# Patient Record
Sex: FEMALE | Race: WHITE | NOT HISPANIC OR LATINO
[De-identification: names, ages, dates, MRNs, and addresses within clinical notes are randomized per-mention and may not be internally consistent; named-entity substitution may affect disease eponyms.]

---

## 2017-02-01 ENCOUNTER — APPOINTMENT (OUTPATIENT)
Dept: MAMMOGRAPHY | Facility: CLINIC | Age: 57
End: 2017-02-01

## 2017-02-01 ENCOUNTER — APPOINTMENT (OUTPATIENT)
Dept: ULTRASOUND IMAGING | Facility: CLINIC | Age: 57
End: 2017-02-01

## 2017-02-08 ENCOUNTER — OUTPATIENT (OUTPATIENT)
Dept: OUTPATIENT SERVICES | Facility: HOSPITAL | Age: 57
LOS: 1 days | End: 2017-02-08
Payer: COMMERCIAL

## 2017-02-08 ENCOUNTER — APPOINTMENT (OUTPATIENT)
Dept: MAMMOGRAPHY | Facility: CLINIC | Age: 57
End: 2017-02-08

## 2017-02-08 ENCOUNTER — APPOINTMENT (OUTPATIENT)
Dept: ULTRASOUND IMAGING | Facility: CLINIC | Age: 57
End: 2017-02-08

## 2017-02-08 DIAGNOSIS — Z00.8 ENCOUNTER FOR OTHER GENERAL EXAMINATION: ICD-10-CM

## 2017-02-08 PROCEDURE — 77067 SCR MAMMO BI INCL CAD: CPT

## 2017-02-08 PROCEDURE — 77063 BREAST TOMOSYNTHESIS BI: CPT

## 2017-02-08 PROCEDURE — 76641 ULTRASOUND BREAST COMPLETE: CPT

## 2017-02-13 DIAGNOSIS — Z12.31 ENCOUNTER FOR SCREENING MAMMOGRAM FOR MALIGNANT NEOPLASM OF BREAST: ICD-10-CM

## 2017-02-13 DIAGNOSIS — R92.2 INCONCLUSIVE MAMMOGRAM: ICD-10-CM

## 2018-01-19 ENCOUNTER — RESULT REVIEW (OUTPATIENT)
Age: 58
End: 2018-01-19

## 2018-01-26 ENCOUNTER — OUTPATIENT (OUTPATIENT)
Dept: OUTPATIENT SERVICES | Facility: HOSPITAL | Age: 58
LOS: 1 days | End: 2018-01-26
Payer: COMMERCIAL

## 2018-01-26 ENCOUNTER — APPOINTMENT (OUTPATIENT)
Dept: MAMMOGRAPHY | Facility: CLINIC | Age: 58
End: 2018-01-26
Payer: COMMERCIAL

## 2018-01-26 ENCOUNTER — APPOINTMENT (OUTPATIENT)
Dept: ULTRASOUND IMAGING | Facility: CLINIC | Age: 58
End: 2018-01-26
Payer: COMMERCIAL

## 2018-01-26 DIAGNOSIS — Z00.8 ENCOUNTER FOR OTHER GENERAL EXAMINATION: ICD-10-CM

## 2018-01-26 PROCEDURE — 76641 ULTRASOUND BREAST COMPLETE: CPT | Mod: 26,50

## 2018-01-26 PROCEDURE — 76641 ULTRASOUND BREAST COMPLETE: CPT

## 2018-01-26 PROCEDURE — 77063 BREAST TOMOSYNTHESIS BI: CPT

## 2018-01-26 PROCEDURE — 77067 SCR MAMMO BI INCL CAD: CPT | Mod: 26

## 2018-01-26 PROCEDURE — 77063 BREAST TOMOSYNTHESIS BI: CPT | Mod: 26

## 2018-01-26 PROCEDURE — 77067 SCR MAMMO BI INCL CAD: CPT

## 2018-05-08 ENCOUNTER — APPOINTMENT (OUTPATIENT)
Dept: DERMATOLOGY | Facility: CLINIC | Age: 58
End: 2018-05-08
Payer: COMMERCIAL

## 2018-05-08 VITALS — WEIGHT: 130 LBS | HEIGHT: 64 IN | BODY MASS INDEX: 22.2 KG/M2

## 2018-05-08 DIAGNOSIS — Z87.39 PERSONAL HISTORY OF OTHER DISEASES OF THE MUSCULOSKELETAL SYSTEM AND CONNECTIVE TISSUE: ICD-10-CM

## 2018-05-08 DIAGNOSIS — Z80.8 FAMILY HISTORY OF MALIGNANT NEOPLASM OF OTHER ORGANS OR SYSTEMS: ICD-10-CM

## 2018-05-08 PROCEDURE — 99203 OFFICE O/P NEW LOW 30 MIN: CPT

## 2018-10-17 ENCOUNTER — APPOINTMENT (OUTPATIENT)
Dept: VASCULAR SURGERY | Facility: CLINIC | Age: 58
End: 2018-10-17
Payer: COMMERCIAL

## 2018-10-17 PROCEDURE — 93926 LOWER EXTREMITY STUDY: CPT

## 2018-10-17 PROCEDURE — 99214 OFFICE O/P EST MOD 30 MIN: CPT

## 2019-02-06 ENCOUNTER — RESULT REVIEW (OUTPATIENT)
Age: 59
End: 2019-02-06

## 2019-02-13 ENCOUNTER — OUTPATIENT (OUTPATIENT)
Dept: OUTPATIENT SERVICES | Facility: HOSPITAL | Age: 59
LOS: 1 days | End: 2019-02-13
Payer: COMMERCIAL

## 2019-02-13 ENCOUNTER — APPOINTMENT (OUTPATIENT)
Dept: ULTRASOUND IMAGING | Facility: CLINIC | Age: 59
End: 2019-02-13
Payer: COMMERCIAL

## 2019-02-13 ENCOUNTER — APPOINTMENT (OUTPATIENT)
Dept: MAMMOGRAPHY | Facility: CLINIC | Age: 59
End: 2019-02-13
Payer: COMMERCIAL

## 2019-02-13 DIAGNOSIS — Z00.8 ENCOUNTER FOR OTHER GENERAL EXAMINATION: ICD-10-CM

## 2019-02-13 PROCEDURE — 77067 SCR MAMMO BI INCL CAD: CPT | Mod: 26

## 2019-02-13 PROCEDURE — 77067 SCR MAMMO BI INCL CAD: CPT

## 2019-02-13 PROCEDURE — 77063 BREAST TOMOSYNTHESIS BI: CPT | Mod: 26

## 2019-02-13 PROCEDURE — 76641 ULTRASOUND BREAST COMPLETE: CPT | Mod: 26,50

## 2019-02-13 PROCEDURE — 76641 ULTRASOUND BREAST COMPLETE: CPT

## 2019-02-13 PROCEDURE — 77063 BREAST TOMOSYNTHESIS BI: CPT

## 2019-04-20 ENCOUNTER — TRANSCRIPTION ENCOUNTER (OUTPATIENT)
Age: 59
End: 2019-04-20

## 2019-05-08 ENCOUNTER — APPOINTMENT (OUTPATIENT)
Dept: DERMATOLOGY | Facility: CLINIC | Age: 59
End: 2019-05-08
Payer: COMMERCIAL

## 2019-05-08 VITALS — BODY MASS INDEX: 22.2 KG/M2 | HEIGHT: 64 IN | WEIGHT: 130 LBS

## 2019-05-08 PROCEDURE — 99213 OFFICE O/P EST LOW 20 MIN: CPT

## 2019-05-08 NOTE — ASSESSMENT
[FreeTextEntry1] : Complete skin examination is negative for malignancy;\par Continue regular exams; FMHx MM\par Follow up for TBSE in 1 year

## 2019-05-08 NOTE — PHYSICAL EXAM
[Full Body Skin Exam Performed] : performed [FreeTextEntry3] : Skin examination performed of the face, neck, trunk, arms, legs; \par The patient is well, alert and oriented, pleasant and cooperative.\par Eyelids, conjunctivae, oral mucosa, digits and nails all normal.  \par No cervical adenopathy.\par \par Normal findings include:\par \par Seborrheic keratoses=- back, shoulders\par Angiomas\par + lentigines and solar damage are present in sun exposed areas; \par \par No lesions were suspicious for malignancy. \par \par

## 2019-05-08 NOTE — HISTORY OF PRESENT ILLNESS
[de-identified] : Pt. presents for skin check;\par No itching, bleeding, growing, changing lesions noted; \par + melanoma in family

## 2019-06-11 ENCOUNTER — EMERGENCY (EMERGENCY)
Facility: HOSPITAL | Age: 59
LOS: 0 days | Discharge: HOME | End: 2019-06-11
Attending: EMERGENCY MEDICINE | Admitting: EMERGENCY MEDICINE
Payer: COMMERCIAL

## 2019-06-11 VITALS
DIASTOLIC BLOOD PRESSURE: 80 MMHG | TEMPERATURE: 98 F | OXYGEN SATURATION: 98 % | HEART RATE: 69 BPM | SYSTOLIC BLOOD PRESSURE: 154 MMHG | RESPIRATION RATE: 18 BRPM

## 2019-06-11 VITALS — WEIGHT: 130.07 LBS | HEIGHT: 65 IN

## 2019-06-11 DIAGNOSIS — Z77.098 CONTACT WITH AND (SUSPECTED) EXPOSURE TO OTHER HAZARDOUS, CHIEFLY NONMEDICINAL, CHEMICALS: ICD-10-CM

## 2019-06-11 DIAGNOSIS — F41.9 ANXIETY DISORDER, UNSPECIFIED: ICD-10-CM

## 2019-06-11 DIAGNOSIS — I10 ESSENTIAL (PRIMARY) HYPERTENSION: ICD-10-CM

## 2019-06-11 PROCEDURE — 99283 EMERGENCY DEPT VISIT LOW MDM: CPT

## 2019-06-11 RX ORDER — FLUORESCEIN SODIUM 9 MG
1 STRIP OPHTHALMIC (EYE) ONCE
Refills: 0 | Status: DISCONTINUED | OUTPATIENT
Start: 2019-06-11 | End: 2019-06-11

## 2019-06-11 RX ORDER — FLUORESCEIN SODIUM 9 MG
1 STRIP OPHTHALMIC (EYE) ONCE
Refills: 0 | Status: COMPLETED | OUTPATIENT
Start: 2019-06-11 | End: 2019-06-11

## 2019-06-11 RX ORDER — ERYTHROMYCIN BASE 5 MG/GRAM
1 OINTMENT (GRAM) OPHTHALMIC (EYE) ONCE
Refills: 0 | Status: COMPLETED | OUTPATIENT
Start: 2019-06-11 | End: 2019-06-11

## 2019-06-11 RX ADMIN — Medication 1 APPLICATION(S): at 22:00

## 2019-06-11 RX ADMIN — Medication 1 DROP(S): at 21:57

## 2019-06-11 RX ADMIN — Medication 1 APPLICATION(S): at 21:56

## 2019-06-11 NOTE — ED STATDOCS - ATTENDING CONTRIBUTION TO CARE
I, Nancy Zamudio MD,  performed the initial face to face bedside interview with this patient regarding history of present illness, review of symptoms and relevant past medical, social and family history.  I completed an independent physical examination.  I was the initial provider who evaluated this patient. I have signed out the follow up of any pending tests (i.e. labs, radiological studies) to the ACP.  I have communicated the patient’s plan of care and disposition with the ACP.  The history, relevant review of systems, past medical and surgical history, medical decision making, and physical examination was documented by the scribe in my presence and I attest to the accuracy of the documentation.

## 2019-06-11 NOTE — ED ADULT NURSE NOTE - OBJECTIVE STATEMENT
Patient reports getting chlorine in eye when filling pool. pt reports flushing eye. redness and irritation noted

## 2019-06-11 NOTE — ED STATDOCS - OBJECTIVE STATEMENT
59 y/o F presents to the ED c/o eye irritation after exposure to super shock. She was opening her pool when the incident occurred. She immediatley flushed her eye but is still having discomfort. 57 y/o F PMH of anxiety and diet controlled HTN, presents to the ED c/o eye irritation after exposure to super shock. She was opening her pool when the incident occurred. She immediatley flushed her eye but is still having discomfort.

## 2019-06-11 NOTE — ED ADULT NURSE NOTE - NSIMPLEMENTINTERV_GEN_ALL_ED
Implemented All Universal Safety Interventions:  [place] to call system. Call bell, personal items and telephone within reach. Instruct patient to call for assistance. Room bathroom lighting operational. Non-slip footwear when patient is off stretcher. Physically safe environment: no spills, clutter or unnecessary equipment. Stretcher in lowest position, wheels locked, appropriate side rails in place.

## 2019-06-11 NOTE — ED STATDOCS - PROGRESS NOTE DETAILS
57 yo female presents with splash of pool shock liquid in her L eye earlier today. Pt washed the eye out at home but feels like there is something there. Fluorescein exam did not show evidence of abrasion but will treat eith erythromycin to prevent infection. Pt able to get in touch with ophthalmology tomorrow to further evaluate the eye. -Alejandro Curiel PA-C

## 2019-06-11 NOTE — ED ADULT TRIAGE NOTE - CHIEF COMPLAINT QUOTE
chlorine in left eye one hour ago while filling pool. flushed eye with water. reports irritation to eye. no vision change.

## 2020-02-10 ENCOUNTER — RESULT REVIEW (OUTPATIENT)
Age: 60
End: 2020-02-10

## 2020-02-22 ENCOUNTER — OUTPATIENT (OUTPATIENT)
Dept: OUTPATIENT SERVICES | Facility: HOSPITAL | Age: 60
LOS: 1 days | End: 2020-02-22
Payer: COMMERCIAL

## 2020-02-22 ENCOUNTER — APPOINTMENT (OUTPATIENT)
Dept: MAMMOGRAPHY | Facility: CLINIC | Age: 60
End: 2020-02-22
Payer: COMMERCIAL

## 2020-02-22 ENCOUNTER — APPOINTMENT (OUTPATIENT)
Dept: ULTRASOUND IMAGING | Facility: CLINIC | Age: 60
End: 2020-02-22
Payer: COMMERCIAL

## 2020-02-22 DIAGNOSIS — Z00.8 ENCOUNTER FOR OTHER GENERAL EXAMINATION: ICD-10-CM

## 2020-02-22 PROCEDURE — 77063 BREAST TOMOSYNTHESIS BI: CPT | Mod: 26

## 2020-02-22 PROCEDURE — 77067 SCR MAMMO BI INCL CAD: CPT

## 2020-02-22 PROCEDURE — 76641 ULTRASOUND BREAST COMPLETE: CPT

## 2020-02-22 PROCEDURE — 77063 BREAST TOMOSYNTHESIS BI: CPT

## 2020-02-22 PROCEDURE — 77067 SCR MAMMO BI INCL CAD: CPT | Mod: 26

## 2020-02-22 PROCEDURE — 76641 ULTRASOUND BREAST COMPLETE: CPT | Mod: 26,50

## 2020-02-26 ENCOUNTER — APPOINTMENT (OUTPATIENT)
Dept: VASCULAR SURGERY | Facility: CLINIC | Age: 60
End: 2020-02-26
Payer: COMMERCIAL

## 2020-02-26 VITALS — HEART RATE: 80 BPM | SYSTOLIC BLOOD PRESSURE: 119 MMHG | DIASTOLIC BLOOD PRESSURE: 69 MMHG

## 2020-02-26 DIAGNOSIS — I73.9 PERIPHERAL VASCULAR DISEASE, UNSPECIFIED: ICD-10-CM

## 2020-02-26 DIAGNOSIS — Z87.891 PERSONAL HISTORY OF NICOTINE DEPENDENCE: ICD-10-CM

## 2020-02-26 DIAGNOSIS — Z82.3 FAMILY HISTORY OF STROKE: ICD-10-CM

## 2020-02-26 DIAGNOSIS — E78.5 HYPERLIPIDEMIA, UNSPECIFIED: ICD-10-CM

## 2020-02-26 PROCEDURE — 99214 OFFICE O/P EST MOD 30 MIN: CPT

## 2020-02-26 PROCEDURE — 93880 EXTRACRANIAL BILAT STUDY: CPT

## 2020-02-26 PROCEDURE — 93926 LOWER EXTREMITY STUDY: CPT

## 2020-03-02 NOTE — END OF VISIT
[FreeTextEntry3] : All medical record entries made by the Scribe were at my, Dr. Hammond's, discretion and personally dictated by me on 02/26/2020 . I have reviewed the chart and agree that the record accurately reflects my personal performance of the history, physical exam, assessment and plan. I have also personally directed, reviewed and agreed to the chart.\par

## 2020-03-02 NOTE — PHYSICAL EXAM
[Respiratory Effort] : normal respiratory effort [No Rash or Lesion] : No rash or lesion [Alert] : alert [Oriented to Person] : oriented to person [Oriented to Place] : oriented to place [Calm] : calm [Oriented to Time] : oriented to time [Normal Rate and Rhythm] : normal rate and rhythm [2+] : right 2+ [JVD] : no jugular venous distention  [Normal Breath Sounds] : Normal breath sounds [Left Carotid Bruit] : no bruit heard over the left carotid [Right Carotid Bruit] : no bruit heard over the right carotid [Ankle Swelling (On Exam)] : not present [] : not present [Varicose Veins Of Lower Extremities] : not present [Abdomen Tenderness] : ~T ~M No abdominal tenderness [de-identified] : Clear  [de-identified] : Well-appearing, NAD [de-identified] : NCAT [de-identified] : FROM

## 2020-03-02 NOTE — ADDENDUM
[FreeTextEntry1] : This note was written by Monet Lau on 02/26/2020 acting as scribe for Dr. Hammond.

## 2020-03-02 NOTE — PROCEDURE
[FreeTextEntry1] : B/L Carotid Duplex demonstrates normal carotids with no plaque.\par \par Arterial Duplex demonstrates RLE patient bypass graft.

## 2020-03-02 NOTE — CONSULT LETTER
[Dear  ___] : Dear  [unfilled], [FreeTextEntry3] : Sincerely, Sincerely, \par \par Claude Hammond M.D. \par , [facility]\par , Department of Surgery [facility]\par Professor of Surgery, [employer] at [facility] \par \par Claude Hammond M.D. \par , [facility]\par , Department of Surgery [facility]\par Professor of Surgery, [employer] at [facility]  [FreeTextEntry1] : I saw Ms Garcia today at your kind referral.  It has been a little over a year since I last saw her. She had a right common iliac to right common femoral artery bypass with reverse saphenous vein in November of 2011. She is doing well and remains very active.  She reports an elevated LDL level on recent blood tests.  She presents for a carotid evaluation given this and the question of whether a statin is needed.  She remains on daily aspirin. \par \par On exam, she has strong pulses in both feet, no edema or skin breakdown. No cervical bruits. Blood pressure is 119/69 and heart rate 80 b/min.\par \par Arterial duplex was showed the bypass on the right is patent without stenosis. The carotid duplex was also negative for any evidence of stenosis or intimal thickening.   \par \par I am pleased that Radha's vein bypass reamins widely patent without any evidence of narrowing.  As sated I think the original arterial obstruction was caused by a fibrotic lesion and not atherosclerotic.  She may be started on a statin to lower LDL levels but I do not see any evidence of atherosclerotic disease.  She should continue her active exercise life style and check with me periodically.   \par \par My complete EMR office note is below for your records.   Hope you are doing well.  \par \par Claude [FreeTextEntry2] : Ronna Aviles MD\par [address], [address], [address]\par [place] [id number]

## 2020-03-02 NOTE — HISTORY OF PRESENT ILLNESS
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 Patient recently saw her PCP where she was worked up with blood work that revealed elevated LDL (141). Patient was sent here to determine her need for statin and carotid duplex. She is currently taking 81 mg ASA. Patient is very active with walking and running 5 times per week on the treadmill. Denies any claudication, rest pain, leg swelling, ulcerations, or TIA/CVA symptoms.\par \par Patient has FHx of stroke (aunt).

## 2020-03-02 NOTE — ASSESSMENT
[Arterial/Venous Disease] : arterial/venous disease [FreeTextEntry1] : 58 y/o F w/ hx of R common iliac to CFA bypass with reversed saphenous vein for external iliac presumed fibrotic lesion (11/2011). Patient is doing well today. Has hyperlipedemia on recent blood tests.  Arterial Duplex today shows patent bypass graft. Also, carotid duplex shows normal carotids with no evidence of plaque. \par \par Discussed the need for statin.  No clear need for it.  I believe the original lesion in the Right external iliac artery was fibrotic from repetitive motion related to bicycling and running.  I also told Radha that there is no need to curtail her active exercise life style.  I am pleased the vein bypass remains widely patent.  Patient will follow up every 1-2 yrs.

## 2020-03-09 ENCOUNTER — APPOINTMENT (OUTPATIENT)
Age: 60
End: 2020-03-09
Payer: COMMERCIAL

## 2020-03-09 VITALS
SYSTOLIC BLOOD PRESSURE: 120 MMHG | BODY MASS INDEX: 21.66 KG/M2 | TEMPERATURE: 98.2 F | HEART RATE: 62 BPM | DIASTOLIC BLOOD PRESSURE: 79 MMHG | WEIGHT: 130 LBS | HEIGHT: 65 IN

## 2020-03-09 DIAGNOSIS — M65.9 SYNOVITIS AND TENOSYNOVITIS, UNSPECIFIED: ICD-10-CM

## 2020-03-09 PROCEDURE — 73560 X-RAY EXAM OF KNEE 1 OR 2: CPT | Mod: LT

## 2020-03-09 PROCEDURE — 99203 OFFICE O/P NEW LOW 30 MIN: CPT

## 2020-03-10 NOTE — DISCUSSION/SUMMARY
[de-identified] : At this time, due to synovitis of the left knee (resolving), I recommend rest, ice and anti-inflammatory.  She will be reassessed in two to three weeks for a possible injection.

## 2020-03-10 NOTE — HISTORY OF PRESENT ILLNESS
[5] : the ailment interference is 5/10 [de-identified] : The patient comes in today with complaints of pain to her left knee.  She has been doing some running and had an onset of pain and swelling.  It is getting a little bit better now.  The patient states the onset/injury occurred in January of 2020.  This injury is not work related or due to an automobile accident. The patient notes the knee is swollen and unsteady.  The patient notes jogging makes her symptoms worse. [] : No

## 2020-03-10 NOTE — PHYSICAL EXAM
[de-identified] : Right Knee: \par Range of Motion in Degrees	\par 	                  Claimant:	Normal:	\par Flexion Active	  135 	                135-degrees	\par Flexion Passive	  135	                135-degrees	\par Extension Active	  0-5	                0-5-degrees	\par Extension Passive	  0-5	                0-5-degrees	\par \par No weakness to flexion/extension.  No evidence of instability in the AP plane or varus or valgus stress.  Negative  Lachman.  Negative pivot shift.  Negative anterior drawer test.  Negative posterior drawer test.  Negative Matthew.  Negative Apley grind.  No medial or lateral joint line tenderness.  No tenderness over the medial and lateral facet of the patella.  No patellofemoral crepitations.  No lateral tilting patella.  No patellar apprehension.  No crepitation in the medial and lateral femoral condyle.  No proximal or distal swelling, edema or tenderness.  No gross motor or sensory deficits.  No intra-articular swelling.  2+ DP and PT pulses. No varus or valgus malalignment.  Skin is intact.  No rashes, scars or lesions.  \par \par Left Knee: \par Range of Motion in Degrees	\par 	                  Claimant:	Normal:	\par Flexion Active	  135 	                135-degrees	\par Flexion Passive	  135	                135-degrees	\par Extension Active	  0-5	                0-5-degrees	\par Extension Passive	  0-5	                0-5-degrees	\par \par No weakness to flexion/extension.  Mild to moderate effusion.  Diffusely tender.  No instability in the AP plane or varus or valgus stress.  Negative Lachman.  Negative pivot shift.  Negative anterior drawer test.  Negative posterior drawer test.  Negative Matthew.  Negative Apley grind.  No medial or lateral joint line tenderness.  No tenderness over the medial or lateral facet of the patella.  No patellofemoral crepitations.  No lateral tilting of the patella.  No patellar apprehension.  No crepitation in the medial and lateral femoral condyle.  No gross motor or sensory deficits.  2+ DP and PT pulses.  No varus or valgus malalignment.  Skin is intact.  No rashes, scars or lesions.\par   [de-identified] : Ambulating with a slightly antalgic to antalgic gait.  Station:  Normal.  [de-identified] : Appearance:  Well-developed, well-nourished female in no acute distress.\par \par   [de-identified] : Radiographs, two views of the left knee, show no obvious osseous abnormalities.

## 2020-03-10 NOTE — ADDENDUM
[FreeTextEntry1] : This note was written by Erika Blanton on 03/10/2020 acting as a scribe for VAL DUMONT III, MD

## 2020-03-10 NOTE — CONSULT LETTER
[Dear  ___] : Dear  [unfilled], [Consult Letter:] : I had the pleasure of evaluating your patient, [unfilled]. [Please see my note below.] : Please see my note below. [Consult Closing:] : Thank you very much for allowing me to participate in the care of this patient.  If you have any questions, please do not hesitate to contact me. [Sincerely,] : Sincerely, [FreeTextEntry3] : Francisco Venegas III, MD\par [facility]/[place]

## 2020-03-26 ENCOUNTER — APPOINTMENT (OUTPATIENT)
Dept: ORTHOPEDIC SURGERY | Facility: CLINIC | Age: 60
End: 2020-03-26

## 2020-04-25 ENCOUNTER — MESSAGE (OUTPATIENT)
Age: 60
End: 2020-04-25

## 2020-05-06 ENCOUNTER — APPOINTMENT (OUTPATIENT)
Dept: DERMATOLOGY | Facility: CLINIC | Age: 60
End: 2020-05-06

## 2020-05-28 ENCOUNTER — APPOINTMENT (OUTPATIENT)
Dept: ORTHOPEDIC SURGERY | Facility: CLINIC | Age: 60
End: 2020-05-28

## 2020-06-26 ENCOUNTER — OUTPATIENT (OUTPATIENT)
Dept: OUTPATIENT SERVICES | Facility: HOSPITAL | Age: 60
LOS: 1 days | End: 2020-06-26
Payer: COMMERCIAL

## 2020-06-26 ENCOUNTER — APPOINTMENT (OUTPATIENT)
Dept: RADIOLOGY | Facility: CLINIC | Age: 60
End: 2020-06-26
Payer: COMMERCIAL

## 2020-06-26 DIAGNOSIS — Z00.8 ENCOUNTER FOR OTHER GENERAL EXAMINATION: ICD-10-CM

## 2020-06-26 PROCEDURE — 77080 DXA BONE DENSITY AXIAL: CPT | Mod: 26

## 2020-06-26 PROCEDURE — 77080 DXA BONE DENSITY AXIAL: CPT

## 2020-10-21 ENCOUNTER — APPOINTMENT (OUTPATIENT)
Dept: DERMATOLOGY | Facility: CLINIC | Age: 60
End: 2020-10-21
Payer: COMMERCIAL

## 2020-10-21 VITALS — BODY MASS INDEX: 21.63 KG/M2 | WEIGHT: 130 LBS

## 2020-10-21 DIAGNOSIS — L72.3 SEBACEOUS CYST: ICD-10-CM

## 2020-10-21 PROCEDURE — 99213 OFFICE O/P EST LOW 20 MIN: CPT | Mod: 25

## 2020-10-21 PROCEDURE — 10060 I&D ABSCESS SIMPLE/SINGLE: CPT

## 2020-10-21 NOTE — HISTORY OF PRESENT ILLNESS
[de-identified] : Pt. presents for skin check;\par No itching, bleeding, growing, changing lesions noted; \par + melanoma in family\par One persistent lesion on lower lip

## 2020-10-21 NOTE — ASSESSMENT
[FreeTextEntry1] : Complete skin examination is negative for malignancy;\par Continue regular exams; FMHx MM\par Follow up for TBSE in 1 year \par \par I&D to milium R lower lip- resolved

## 2020-10-21 NOTE — PHYSICAL EXAM
[Full Body Skin Exam Performed] : performed [FreeTextEntry3] : Skin examination performed of the face, neck, trunk, arms, legs; \par The patient is well, alert and oriented, pleasant and cooperative.\par Eyelids, conjunctivae, oral mucosa, digits and nails all normal.  \par No cervical adenopathy.\par \par Normal findings include:\par \par Seborrheic keratoses=- back, shoulders\par Angiomas\par + lentigines and solar damage are present in sun exposed areas; \par \par No lesions were suspicious for malignancy. \par \par inflamed large milium;  R lower lip border\par \par

## 2021-02-17 ENCOUNTER — RESULT REVIEW (OUTPATIENT)
Age: 61
End: 2021-02-17

## 2021-03-10 ENCOUNTER — APPOINTMENT (OUTPATIENT)
Dept: MAMMOGRAPHY | Facility: CLINIC | Age: 61
End: 2021-03-10
Payer: COMMERCIAL

## 2021-03-10 ENCOUNTER — OUTPATIENT (OUTPATIENT)
Dept: OUTPATIENT SERVICES | Facility: HOSPITAL | Age: 61
LOS: 1 days | End: 2021-03-10
Payer: COMMERCIAL

## 2021-03-10 ENCOUNTER — APPOINTMENT (OUTPATIENT)
Dept: ULTRASOUND IMAGING | Facility: CLINIC | Age: 61
End: 2021-03-10
Payer: COMMERCIAL

## 2021-03-10 DIAGNOSIS — Z12.31 ENCOUNTER FOR SCREENING MAMMOGRAM FOR MALIGNANT NEOPLASM OF BREAST: ICD-10-CM

## 2021-03-10 DIAGNOSIS — R92.2 INCONCLUSIVE MAMMOGRAM: ICD-10-CM

## 2021-03-10 DIAGNOSIS — Z00.8 ENCOUNTER FOR OTHER GENERAL EXAMINATION: ICD-10-CM

## 2021-03-10 PROCEDURE — 77063 BREAST TOMOSYNTHESIS BI: CPT

## 2021-03-10 PROCEDURE — 77067 SCR MAMMO BI INCL CAD: CPT

## 2021-03-10 PROCEDURE — 77067 SCR MAMMO BI INCL CAD: CPT | Mod: 26

## 2021-03-10 PROCEDURE — 77063 BREAST TOMOSYNTHESIS BI: CPT | Mod: 26

## 2021-03-10 PROCEDURE — 76641 ULTRASOUND BREAST COMPLETE: CPT | Mod: 26,50

## 2021-03-10 PROCEDURE — 76641 ULTRASOUND BREAST COMPLETE: CPT

## 2021-03-18 ENCOUNTER — APPOINTMENT (OUTPATIENT)
Dept: MAMMOGRAPHY | Facility: CLINIC | Age: 61
End: 2021-03-18
Payer: COMMERCIAL

## 2021-03-18 ENCOUNTER — OUTPATIENT (OUTPATIENT)
Dept: OUTPATIENT SERVICES | Facility: HOSPITAL | Age: 61
LOS: 1 days | End: 2021-03-18
Payer: COMMERCIAL

## 2021-03-18 ENCOUNTER — APPOINTMENT (OUTPATIENT)
Dept: ULTRASOUND IMAGING | Facility: CLINIC | Age: 61
End: 2021-03-18
Payer: COMMERCIAL

## 2021-03-18 DIAGNOSIS — Z00.8 ENCOUNTER FOR OTHER GENERAL EXAMINATION: ICD-10-CM

## 2021-03-18 PROCEDURE — G0279: CPT

## 2021-03-18 PROCEDURE — 77065 DX MAMMO INCL CAD UNI: CPT | Mod: 26,RT

## 2021-03-18 PROCEDURE — 76642 ULTRASOUND BREAST LIMITED: CPT | Mod: 26,RT

## 2021-03-18 PROCEDURE — G0279: CPT | Mod: 26

## 2021-03-18 PROCEDURE — 76642 ULTRASOUND BREAST LIMITED: CPT

## 2021-03-18 PROCEDURE — 77065 DX MAMMO INCL CAD UNI: CPT

## 2021-09-13 ENCOUNTER — OUTPATIENT (OUTPATIENT)
Dept: OUTPATIENT SERVICES | Facility: HOSPITAL | Age: 61
LOS: 1 days | End: 2021-09-13
Payer: COMMERCIAL

## 2021-09-13 ENCOUNTER — APPOINTMENT (OUTPATIENT)
Dept: MAMMOGRAPHY | Facility: CLINIC | Age: 61
End: 2021-09-13
Payer: COMMERCIAL

## 2021-09-13 ENCOUNTER — APPOINTMENT (OUTPATIENT)
Dept: ULTRASOUND IMAGING | Facility: CLINIC | Age: 61
End: 2021-09-13
Payer: COMMERCIAL

## 2021-09-13 DIAGNOSIS — Z00.8 ENCOUNTER FOR OTHER GENERAL EXAMINATION: ICD-10-CM

## 2021-09-13 PROCEDURE — 76642 ULTRASOUND BREAST LIMITED: CPT | Mod: 26,RT

## 2021-09-13 PROCEDURE — 77065 DX MAMMO INCL CAD UNI: CPT | Mod: 26,RT

## 2021-09-13 PROCEDURE — G0279: CPT | Mod: 26

## 2021-09-13 PROCEDURE — G0279: CPT

## 2021-09-13 PROCEDURE — 77065 DX MAMMO INCL CAD UNI: CPT

## 2021-09-13 PROCEDURE — 76642 ULTRASOUND BREAST LIMITED: CPT

## 2021-10-27 ENCOUNTER — APPOINTMENT (OUTPATIENT)
Dept: DERMATOLOGY | Facility: CLINIC | Age: 61
End: 2021-10-27
Payer: COMMERCIAL

## 2021-10-27 PROCEDURE — 99213 OFFICE O/P EST LOW 20 MIN: CPT

## 2021-10-27 NOTE — HISTORY OF PRESENT ILLNESS
[de-identified] : Pt. presents for skin check;\par c/o few spots of concern;  \par Severity:  mild  \par Modifying factors:  none\par Associated symptoms:  none\par Context:  no association with activity\par + melanoma in family\par

## 2021-10-27 NOTE — ASSESSMENT
[FreeTextEntry1] : Complete skin examination is negative for malignancy; Multiple new concerns were addressed and discussed.\par Therapeutic options and their risks and benefits; along with multiple diagnostic possibilities were discussed at length;\par risks and benefits of skin biopsy and/or other further study were discussed;\par \par \par Continue regular exams; FMHx MM\par Follow up for TBSE in 1 year \par

## 2021-10-27 NOTE — PHYSICAL EXAM
[Full Body Skin Exam Performed] : performed [FreeTextEntry3] : Skin examination performed of the face, neck, trunk, arms, legs; \par The patient is well, alert and oriented, pleasant and cooperative.\par Eyelids, conjunctivae, oral mucosa, digits and nails all normal.  \par No cervical adenopathy.\par \par Normal findings include:\par \par Seborrheic keratoses=- back, shoulders\par Angiomas\par + lentigines and solar damage are present in sun exposed areas; \par \par No lesions were suspicious for malignancy. \par \par \par \par

## 2021-11-03 ENCOUNTER — APPOINTMENT (OUTPATIENT)
Dept: ORTHOPEDIC SURGERY | Facility: CLINIC | Age: 61
End: 2021-11-03

## 2022-01-14 ENCOUNTER — TRANSCRIPTION ENCOUNTER (OUTPATIENT)
Age: 62
End: 2022-01-14

## 2022-01-24 ENCOUNTER — TRANSCRIPTION ENCOUNTER (OUTPATIENT)
Age: 62
End: 2022-01-24

## 2022-01-24 ENCOUNTER — APPOINTMENT (OUTPATIENT)
Dept: CT IMAGING | Facility: CLINIC | Age: 62
End: 2022-01-24
Payer: COMMERCIAL

## 2022-01-24 ENCOUNTER — OUTPATIENT (OUTPATIENT)
Dept: OUTPATIENT SERVICES | Facility: HOSPITAL | Age: 62
LOS: 1 days | End: 2022-01-24
Payer: COMMERCIAL

## 2022-01-24 DIAGNOSIS — Z00.8 ENCOUNTER FOR OTHER GENERAL EXAMINATION: ICD-10-CM

## 2022-01-24 PROCEDURE — 74178 CT ABD&PLV WO CNTR FLWD CNTR: CPT

## 2022-01-24 PROCEDURE — 74178 CT ABD&PLV WO CNTR FLWD CNTR: CPT | Mod: 26

## 2022-01-24 PROCEDURE — 82565 ASSAY OF CREATININE: CPT

## 2022-02-09 ENCOUNTER — RESULT REVIEW (OUTPATIENT)
Age: 62
End: 2022-02-09

## 2022-03-21 ENCOUNTER — OUTPATIENT (OUTPATIENT)
Dept: OUTPATIENT SERVICES | Facility: HOSPITAL | Age: 62
LOS: 1 days | End: 2022-03-21
Payer: COMMERCIAL

## 2022-03-21 ENCOUNTER — APPOINTMENT (OUTPATIENT)
Dept: ULTRASOUND IMAGING | Facility: CLINIC | Age: 62
End: 2022-03-21
Payer: COMMERCIAL

## 2022-03-21 ENCOUNTER — APPOINTMENT (OUTPATIENT)
Dept: MAMMOGRAPHY | Facility: CLINIC | Age: 62
End: 2022-03-21
Payer: COMMERCIAL

## 2022-03-21 DIAGNOSIS — Z00.8 ENCOUNTER FOR OTHER GENERAL EXAMINATION: ICD-10-CM

## 2022-03-21 PROCEDURE — G0279: CPT | Mod: 26

## 2022-03-21 PROCEDURE — 76641 ULTRASOUND BREAST COMPLETE: CPT | Mod: 26,50

## 2022-03-21 PROCEDURE — 77066 DX MAMMO INCL CAD BI: CPT

## 2022-03-21 PROCEDURE — 77066 DX MAMMO INCL CAD BI: CPT | Mod: 26

## 2022-03-21 PROCEDURE — G0279: CPT

## 2022-03-21 PROCEDURE — 76641 ULTRASOUND BREAST COMPLETE: CPT

## 2022-11-02 ENCOUNTER — APPOINTMENT (OUTPATIENT)
Dept: DERMATOLOGY | Facility: CLINIC | Age: 62
End: 2022-11-02

## 2022-11-02 PROCEDURE — 99213 OFFICE O/P EST LOW 20 MIN: CPT

## 2022-11-02 NOTE — HISTORY OF PRESENT ILLNESS
[de-identified] : Pt. presents for skin check;\par c/o few spots of concern;  \par Severity:  mild  \par Modifying factors:  none\par Associated symptoms:  none\par Context:  no association with activity\par + melanoma in family\par

## 2023-01-12 ENCOUNTER — OFFICE (OUTPATIENT)
Dept: URBAN - METROPOLITAN AREA CLINIC 109 | Facility: CLINIC | Age: 63
Setting detail: OPHTHALMOLOGY
End: 2023-01-12
Payer: COMMERCIAL

## 2023-01-12 DIAGNOSIS — H01.004: ICD-10-CM

## 2023-01-12 DIAGNOSIS — H01.002: ICD-10-CM

## 2023-01-12 DIAGNOSIS — H16.223: ICD-10-CM

## 2023-01-12 DIAGNOSIS — H01.005: ICD-10-CM

## 2023-01-12 DIAGNOSIS — H01.001: ICD-10-CM

## 2023-01-12 DIAGNOSIS — H43.393: ICD-10-CM

## 2023-01-12 DIAGNOSIS — H25.13: ICD-10-CM

## 2023-01-12 PROCEDURE — 92014 COMPRE OPH EXAM EST PT 1/>: CPT | Performed by: OPHTHALMOLOGY

## 2023-01-12 ASSESSMENT — AXIALLENGTH_DERIVED
OD_AL: 22.6926
OS_AL: 22.7299

## 2023-01-12 ASSESSMENT — LID EXAM ASSESSMENTS
OS_BLEPHARITIS: LLL LUL 1+
OD_BLEPHARITIS: RLL RUL 1+

## 2023-01-12 ASSESSMENT — REFRACTION_AUTOREFRACTION
OS_CYLINDER: -1.00
OD_SPHERE: +1.25
OD_CYLINDER: -1.00
OD_AXIS: 101
OS_AXIS: 90
OS_SPHERE: +1.50

## 2023-01-12 ASSESSMENT — SUPERFICIAL PUNCTATE KERATITIS (SPK)
OS_SPK: 1+
OD_SPK: T

## 2023-01-12 ASSESSMENT — DECREASING TEAR LAKE - SEVERITY SCORE
OS_DEC_TEARLAKE: 1+
OD_DEC_TEARLAKE: 1+

## 2023-01-12 ASSESSMENT — KERATOMETRY
OS_AXISANGLE_DEGREES: 163
OS_K1POWER_DIOPTERS: 44.75
OD_K2POWER_DIOPTERS: 45.50
OD_AXISANGLE_DEGREES: 53
OD_K1POWER_DIOPTERS: 45.00
OS_K2POWER_DIOPTERS: 45.00

## 2023-01-12 ASSESSMENT — VISUAL ACUITY
OD_BCVA: 20/30-2
OS_BCVA: 20/20-2

## 2023-01-12 ASSESSMENT — SPHEQUIV_DERIVED
OS_SPHEQUIV: 1
OD_SPHEQUIV: 0.75

## 2023-01-12 ASSESSMENT — CONFRONTATIONAL VISUAL FIELD TEST (CVF)
OD_FINDINGS: FULL
OS_FINDINGS: FULL

## 2023-01-12 ASSESSMENT — TONOMETRY
OD_IOP_MMHG: 15
OS_IOP_MMHG: 16

## 2023-01-16 ENCOUNTER — APPOINTMENT (OUTPATIENT)
Dept: ULTRASOUND IMAGING | Facility: CLINIC | Age: 63
End: 2023-01-16
Payer: COMMERCIAL

## 2023-01-16 ENCOUNTER — OUTPATIENT (OUTPATIENT)
Dept: OUTPATIENT SERVICES | Facility: HOSPITAL | Age: 63
LOS: 1 days | End: 2023-01-16
Payer: COMMERCIAL

## 2023-01-16 DIAGNOSIS — R31.0 GROSS HEMATURIA: ICD-10-CM

## 2023-01-16 PROCEDURE — 76770 US EXAM ABDO BACK WALL COMP: CPT

## 2023-01-16 PROCEDURE — 76770 US EXAM ABDO BACK WALL COMP: CPT | Mod: 26

## 2023-03-22 ENCOUNTER — APPOINTMENT (OUTPATIENT)
Dept: RADIOLOGY | Facility: CLINIC | Age: 63
End: 2023-03-22
Payer: COMMERCIAL

## 2023-03-22 ENCOUNTER — APPOINTMENT (OUTPATIENT)
Dept: ULTRASOUND IMAGING | Facility: CLINIC | Age: 63
End: 2023-03-22
Payer: COMMERCIAL

## 2023-03-22 ENCOUNTER — OUTPATIENT (OUTPATIENT)
Dept: OUTPATIENT SERVICES | Facility: HOSPITAL | Age: 63
LOS: 1 days | End: 2023-03-22
Payer: COMMERCIAL

## 2023-03-22 ENCOUNTER — APPOINTMENT (OUTPATIENT)
Dept: MAMMOGRAPHY | Facility: CLINIC | Age: 63
End: 2023-03-22
Payer: COMMERCIAL

## 2023-03-22 DIAGNOSIS — N95.9 UNSPECIFIED MENOPAUSAL AND PERIMENOPAUSAL DISORDER: ICD-10-CM

## 2023-03-22 DIAGNOSIS — Z12.31 ENCOUNTER FOR SCREENING MAMMOGRAM FOR MALIGNANT NEOPLASM OF BREAST: ICD-10-CM

## 2023-03-22 DIAGNOSIS — R92.2 INCONCLUSIVE MAMMOGRAM: ICD-10-CM

## 2023-03-22 PROCEDURE — 76641 ULTRASOUND BREAST COMPLETE: CPT | Mod: 26,50

## 2023-03-22 PROCEDURE — 77080 DXA BONE DENSITY AXIAL: CPT

## 2023-03-22 PROCEDURE — 77080 DXA BONE DENSITY AXIAL: CPT | Mod: 26

## 2023-03-22 PROCEDURE — 76641 ULTRASOUND BREAST COMPLETE: CPT

## 2023-03-22 PROCEDURE — 77063 BREAST TOMOSYNTHESIS BI: CPT

## 2023-03-22 PROCEDURE — 77063 BREAST TOMOSYNTHESIS BI: CPT | Mod: 26

## 2023-03-22 PROCEDURE — 77067 SCR MAMMO BI INCL CAD: CPT | Mod: 26

## 2023-03-22 PROCEDURE — 77067 SCR MAMMO BI INCL CAD: CPT

## 2023-06-12 ENCOUNTER — NON-APPOINTMENT (OUTPATIENT)
Age: 63
End: 2023-06-12

## 2023-06-13 ENCOUNTER — APPOINTMENT (OUTPATIENT)
Dept: DERMATOLOGY | Facility: CLINIC | Age: 63
End: 2023-06-13
Payer: COMMERCIAL

## 2023-06-13 VITALS — WEIGHT: 125 LBS | HEIGHT: 65 IN | BODY MASS INDEX: 20.83 KG/M2

## 2023-06-13 PROCEDURE — 99214 OFFICE O/P EST MOD 30 MIN: CPT

## 2023-06-13 PROCEDURE — 0028D: CPT

## 2023-06-13 RX ORDER — MINOCYCLINE HYDROCHLORIDE 75 MG/1
TABLET ORAL
Refills: 0 | Status: DISCONTINUED | COMMUNITY
End: 2023-06-13

## 2023-06-13 RX ORDER — HYDROQUINONE 40 MG/G
4 CREAM TOPICAL
Qty: 1 | Refills: 0 | Status: ACTIVE | COMMUNITY
Start: 2023-06-13 | End: 1900-01-01

## 2023-06-13 NOTE — HISTORY OF PRESENT ILLNESS
[FreeTextEntry1] : brown patch [de-identified] : EBER BORJA is a 62 year old F who present for f/u as below. \par \par #Brown patch "mustache" on upper lip, worse in summertime, xmonths. \par Of note pt has been taking minocycline 100mg daily per PCP for MANY YEARS. \par \par Personal hx of skin cancer: no\par FHx of skin cancer: +melanoma in mom and maternal uncle\par Social hx: engaged; cybersecurity IT at [employer]

## 2023-06-13 NOTE — ASSESSMENT
[FreeTextEntry1] : #Hyperpigmentation of upper lip and palate - new dx of uncertain prognosis\par Favor minocycline-induced pigmentation\par DDx melasma, though less likely\par - Diagnosis and treatment options discussed\par We have discussed the nature and course of this condition.\par I have discussed the goals of therapy with the patient.\par We have discussed treatment options and expectations from treatment.\par - STOP minocycline\par - Start hydroquinone 4% BID to AA for 2 months. SED\par - Tinted SPF EltaMD recommended\par \par Photo in chart\par RTC 2 months

## 2023-06-13 NOTE — PHYSICAL EXAM
[Alert] : alert [Oriented x 3] : ~L oriented x 3 [Well Nourished] : well nourished [Conjunctiva Non-injected] : conjunctiva non-injected [No Visual Lymphadenopathy] : no visual  lymphadenopathy [No Clubbing] : no clubbing [No Edema] : no edema [No Bromhidrosis] : no bromhidrosis [No Chromhidrosis] : no chromhidrosis [Declined] : declined [FreeTextEntry3] : Focused exam only (see below) per patient request:\par \par greyish patches on upper lip, as well as palate

## 2023-08-31 ENCOUNTER — APPOINTMENT (OUTPATIENT)
Dept: DERMATOLOGY | Facility: CLINIC | Age: 63
End: 2023-08-31
Payer: COMMERCIAL

## 2023-08-31 PROCEDURE — 99214 OFFICE O/P EST MOD 30 MIN: CPT

## 2023-08-31 NOTE — PHYSICAL EXAM
[Alert] : alert [Oriented x 3] : ~L oriented x 3 [Well Nourished] : well nourished [Conjunctiva Non-injected] : conjunctiva non-injected [No Visual Lymphadenopathy] : no visual  lymphadenopathy [No Clubbing] : no clubbing [No Edema] : no edema [No Bromhidrosis] : no bromhidrosis [No Chromhidrosis] : no chromhidrosis [Declined] : declined [FreeTextEntry3] : Focused exam only (see below) per patient request:  greyish patches on upper lip, as well as >> palate

## 2023-08-31 NOTE — HISTORY OF PRESENT ILLNESS
[FreeTextEntry1] : brown patch [de-identified] : EBER BORJA is a 62 year old F who present for f/u as below.   #Brown patch "mustache" on upper lip, worse in summertime, xmonths.  Of note pt has been taking minocycline 100mg daily per PCP for MANY YEARS.  - 6/13/23: Rx'ed hydroquinone 4% cream BID  - 8/31/23: pt notes no improvement with HQ 4%; notes occasional irritation.   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

## 2023-08-31 NOTE — ASSESSMENT
[FreeTextEntry1] : #Hyperpigmentation of upper lip and palate - chronic; exacerbation Favor minocycline-induced pigmentation DDx melasma, though less likely.  Failed hydroquinone 4%  - Diagnosis and treatment options discussed We have discussed the nature and course of this condition. I have discussed the goals of therapy with the patient. We have discussed treatment options and expectations from treatment. - Tinted SPF EltaMD  - Start [employer] hydroquinone cream nightly for 3 months; email [email or web address] Hydroquinone: 8% Tretinoin: 0.025% Kojic Acid: 1% Niacinamide: 4% Fluocinolone: 0.025%  Photo in chart RTC 3 months

## 2023-11-08 ENCOUNTER — APPOINTMENT (OUTPATIENT)
Dept: DERMATOLOGY | Facility: CLINIC | Age: 63
End: 2023-11-08
Payer: COMMERCIAL

## 2023-11-08 DIAGNOSIS — L82.1 OTHER SEBORRHEIC KERATOSIS: ICD-10-CM

## 2023-11-08 DIAGNOSIS — D22.5 MELANOCYTIC NEVI OF TRUNK: ICD-10-CM

## 2023-11-08 DIAGNOSIS — L81.4 OTHER MELANIN HYPERPIGMENTATION: ICD-10-CM

## 2023-11-08 PROCEDURE — 99214 OFFICE O/P EST MOD 30 MIN: CPT

## 2023-11-16 ENCOUNTER — APPOINTMENT (OUTPATIENT)
Dept: DERMATOLOGY | Facility: CLINIC | Age: 63
End: 2023-11-16
Payer: COMMERCIAL

## 2023-11-16 DIAGNOSIS — L24.9 IRRITANT CONTACT DERMATITIS, UNSPECIFIED CAUSE: ICD-10-CM

## 2023-11-16 PROCEDURE — 99214 OFFICE O/P EST MOD 30 MIN: CPT

## 2024-02-03 ENCOUNTER — OUTPATIENT (OUTPATIENT)
Dept: OUTPATIENT SERVICES | Facility: HOSPITAL | Age: 64
LOS: 1 days | End: 2024-02-03
Payer: COMMERCIAL

## 2024-02-03 ENCOUNTER — APPOINTMENT (OUTPATIENT)
Dept: ULTRASOUND IMAGING | Facility: CLINIC | Age: 64
End: 2024-02-03
Payer: COMMERCIAL

## 2024-02-03 DIAGNOSIS — Z00.8 ENCOUNTER FOR OTHER GENERAL EXAMINATION: ICD-10-CM

## 2024-02-03 DIAGNOSIS — N20.0 CALCULUS OF KIDNEY: ICD-10-CM

## 2024-02-03 PROCEDURE — 76770 US EXAM ABDO BACK WALL COMP: CPT | Mod: 26

## 2024-02-03 PROCEDURE — 76770 US EXAM ABDO BACK WALL COMP: CPT

## 2024-02-14 ENCOUNTER — APPOINTMENT (OUTPATIENT)
Dept: MAMMOGRAPHY | Facility: CLINIC | Age: 64
End: 2024-02-14
Payer: COMMERCIAL

## 2024-02-14 ENCOUNTER — OUTPATIENT (OUTPATIENT)
Dept: OUTPATIENT SERVICES | Facility: HOSPITAL | Age: 64
LOS: 1 days | End: 2024-02-14
Payer: COMMERCIAL

## 2024-02-14 ENCOUNTER — APPOINTMENT (OUTPATIENT)
Dept: ULTRASOUND IMAGING | Facility: CLINIC | Age: 64
End: 2024-02-14
Payer: COMMERCIAL

## 2024-02-14 DIAGNOSIS — Z00.8 ENCOUNTER FOR OTHER GENERAL EXAMINATION: ICD-10-CM

## 2024-02-14 PROCEDURE — 77066 DX MAMMO INCL CAD BI: CPT | Mod: 26

## 2024-02-14 PROCEDURE — G0279: CPT

## 2024-02-14 PROCEDURE — G0279: CPT | Mod: 26

## 2024-02-14 PROCEDURE — 77066 DX MAMMO INCL CAD BI: CPT

## 2024-02-14 PROCEDURE — 76641 ULTRASOUND BREAST COMPLETE: CPT | Mod: 26,50

## 2024-02-14 PROCEDURE — 76641 ULTRASOUND BREAST COMPLETE: CPT

## 2024-02-15 ENCOUNTER — APPOINTMENT (OUTPATIENT)
Dept: DERMATOLOGY | Facility: CLINIC | Age: 64
End: 2024-02-15
Payer: COMMERCIAL

## 2024-02-15 DIAGNOSIS — L81.9 DISORDER OF PIGMENTATION, UNSPECIFIED: ICD-10-CM

## 2024-02-15 PROCEDURE — 99213 OFFICE O/P EST LOW 20 MIN: CPT

## 2024-02-15 NOTE — ASSESSMENT
[FreeTextEntry1] : #Hyperpigmentation of upper lip and palate - chronic; persistent.  No improvement with combination hydroquinone cream (also irritating with use), as well as hydroquinone 4%.   Favor minocycline-induced pigmentation. - Diagnosis, chronic nature, disease course, treatment options and goals of therapy discussed - Tinted SPF EltaMD - INCREASE to BID use of TXA 8%/azelaic compounded cream - STOP below [facility] hydroquinone cream: Hydroquinone: 8%, Tretinoin: 0.025%, Kojic Acid: 1%, Niacinamide: 4%, Fluocinolone: 0.025% - Referral to [facility] or [facility] center for Qswitch laser vs. other  Photo in chart RTC PRN

## 2024-02-15 NOTE — HISTORY OF PRESENT ILLNESS
[FreeTextEntry1] : brown patch [de-identified] : EBER BORJA is a 63 year old F who present for f/u as below.  LV 11/8/23 for FBSE  #Brown patch "mustache" on upper lip, worse in summertime, xmonths.  Of note pt has been taking minocycline 100mg daily per PCP for MANY YEARS.  - 6/13/23: Rx'ed hydroquinone 4% cream BID  - 8/31/23: pt notes no improvement with HQ 4%; notes occasional irritation.  - 11/16/23: no improvement with HQ 8%/tret 0.025%/KA/niacinamide/fluocinolone 0.025% nightly since LV. Pt notes peeling, redness, and irritation  Personal hx of skin cancer: no FHx of skin cancer: +melanoma in mom and maternal uncle Social hx: sister is PCP; [employer] at [employer]. Email [email or web address]

## 2024-04-04 ENCOUNTER — APPOINTMENT (OUTPATIENT)
Dept: ORTHOPEDIC SURGERY | Facility: CLINIC | Age: 64
End: 2024-04-04
Payer: COMMERCIAL

## 2024-04-04 VITALS — BODY MASS INDEX: 20.83 KG/M2 | WEIGHT: 125 LBS | HEIGHT: 65 IN

## 2024-04-04 DIAGNOSIS — S43.422A SPRAIN OF LEFT ROTATOR CUFF CAPSULE, INITIAL ENCOUNTER: ICD-10-CM

## 2024-04-04 PROCEDURE — J3490M: CUSTOM

## 2024-04-04 PROCEDURE — 99203 OFFICE O/P NEW LOW 30 MIN: CPT | Mod: 25

## 2024-04-04 PROCEDURE — 20610 DRAIN/INJ JOINT/BURSA W/O US: CPT | Mod: LT

## 2024-04-04 PROCEDURE — 73030 X-RAY EXAM OF SHOULDER: CPT | Mod: LT

## 2024-04-04 NOTE — PHYSICAL EXAM
[de-identified] : L shoulder: +swelling Decreased ROM in Add, abd, IR/ER, FF +impingement  Xrays neg

## 2024-04-04 NOTE — HISTORY OF PRESENT ILLNESS
[de-identified] : L arm pain for a few months  She fell on it about 2 months ago   [Left Arm] : left arm [5] : 5 [4] : 4 [Dull/Aching] : dull/aching [Nothing helps with pain getting better] : Nothing helps with pain getting better [] : no [FreeTextEntry5] : she fell jogging 2 months ago [de-identified] : activity

## 2024-04-13 ENCOUNTER — RX ONLY (RX ONLY)
Age: 64
End: 2024-04-13

## 2024-04-13 ENCOUNTER — OFFICE (OUTPATIENT)
Dept: URBAN - METROPOLITAN AREA CLINIC 102 | Facility: CLINIC | Age: 64
Setting detail: OPHTHALMOLOGY
End: 2024-04-13
Payer: COMMERCIAL

## 2024-04-13 DIAGNOSIS — H25.13: ICD-10-CM

## 2024-04-13 DIAGNOSIS — H43.393: ICD-10-CM

## 2024-04-13 DIAGNOSIS — H01.001: ICD-10-CM

## 2024-04-13 DIAGNOSIS — H16.223: ICD-10-CM

## 2024-04-13 DIAGNOSIS — H01.004: ICD-10-CM

## 2024-04-13 PROCEDURE — 92014 COMPRE OPH EXAM EST PT 1/>: CPT | Performed by: STUDENT IN AN ORGANIZED HEALTH CARE EDUCATION/TRAINING PROGRAM

## 2024-04-13 ASSESSMENT — LID EXAM ASSESSMENTS
OD_BLEPHARITIS: RLL RUL 1+
OS_BLEPHARITIS: LLL LUL 1+

## 2024-12-20 ENCOUNTER — APPOINTMENT (OUTPATIENT)
Dept: DERMATOLOGY | Facility: CLINIC | Age: 64
End: 2024-12-20
Payer: COMMERCIAL

## 2024-12-20 DIAGNOSIS — L81.9 DISORDER OF PIGMENTATION, UNSPECIFIED: ICD-10-CM

## 2024-12-20 DIAGNOSIS — L82.1 OTHER SEBORRHEIC KERATOSIS: ICD-10-CM

## 2024-12-20 DIAGNOSIS — L81.4 OTHER MELANIN HYPERPIGMENTATION: ICD-10-CM

## 2024-12-20 PROCEDURE — 99214 OFFICE O/P EST MOD 30 MIN: CPT

## 2025-02-17 ENCOUNTER — APPOINTMENT (OUTPATIENT)
Dept: ULTRASOUND IMAGING | Facility: CLINIC | Age: 65
End: 2025-02-17
Payer: COMMERCIAL

## 2025-02-17 ENCOUNTER — OUTPATIENT (OUTPATIENT)
Dept: OUTPATIENT SERVICES | Facility: HOSPITAL | Age: 65
LOS: 1 days | End: 2025-02-17
Payer: COMMERCIAL

## 2025-02-17 DIAGNOSIS — Z00.8 ENCOUNTER FOR OTHER GENERAL EXAMINATION: ICD-10-CM

## 2025-02-17 PROCEDURE — 76770 US EXAM ABDO BACK WALL COMP: CPT | Mod: 26

## 2025-02-17 PROCEDURE — 76770 US EXAM ABDO BACK WALL COMP: CPT
